# Patient Record
Sex: FEMALE | Race: WHITE | NOT HISPANIC OR LATINO | Employment: OTHER | URBAN - METROPOLITAN AREA
[De-identification: names, ages, dates, MRNs, and addresses within clinical notes are randomized per-mention and may not be internally consistent; named-entity substitution may affect disease eponyms.]

---

## 2017-05-08 NOTE — PATIENT DISCUSSION
New Prescription: FML Forte (fluorometholone): drops,suspension: 0.25% 1 drop three times a day into both eyes 05-

## 2018-08-16 ENCOUNTER — ESTABLISHED COMPREHENSIVE EXAM (OUTPATIENT)
Dept: URBAN - METROPOLITAN AREA CLINIC 39 | Facility: CLINIC | Age: 73
End: 2018-08-16

## 2018-08-16 DIAGNOSIS — H35.371: ICD-10-CM

## 2018-08-16 DIAGNOSIS — H16.223: ICD-10-CM

## 2018-08-16 DIAGNOSIS — H04.123: ICD-10-CM

## 2018-08-16 DIAGNOSIS — Z96.1: ICD-10-CM

## 2018-08-16 PROCEDURE — G8427 DOCREV CUR MEDS BY ELIG CLIN: HCPCS

## 2018-08-16 PROCEDURE — 4040F PNEUMOC VAC/ADMIN/RCVD: CPT

## 2018-08-16 PROCEDURE — 68761S PUNCTUM PLUG / SILICONE,EACH

## 2018-08-16 PROCEDURE — 92015 DETERMINE REFRACTIVE STATE: CPT

## 2018-08-16 PROCEDURE — G8785 BP SCRN NO PERF AT INTERVAL: HCPCS

## 2018-08-16 PROCEDURE — A4263 PERMANENT TEAR DUCT PLUG: HCPCS

## 2018-08-16 PROCEDURE — G9903 PT SCRN TBCO ID AS NON USER: HCPCS

## 2018-08-16 PROCEDURE — 99214 OFFICE O/P EST MOD 30 MIN: CPT

## 2018-08-16 PROCEDURE — G8483 FLU IMM NO ADMIN DOC REA: HCPCS

## 2018-08-16 PROCEDURE — 1036F TOBACCO NON-USER: CPT

## 2018-08-16 RX ORDER — LIFITEGRAST 50 MG/ML: 1 SOLUTION/ DROPS OPHTHALMIC TWICE A DAY

## 2018-08-16 ASSESSMENT — VISUAL ACUITY
OS_SC: J4
OD_SC: 20/60-1
OD_SC: J8
OS_PH: 20/40
OS_CC: J5
OD_CC: 20/60-1
OD_PH: 20/40
OS_CC: 20/50+2
OS_SC: 20/40+1
OD_CC: J3

## 2018-08-16 ASSESSMENT — TONOMETRY
OD_IOP_MMHG: 16
OS_IOP_MMHG: 18

## 2018-09-27 ENCOUNTER — FOLLOW UP (OUTPATIENT)
Dept: URBAN - METROPOLITAN AREA CLINIC 39 | Facility: CLINIC | Age: 73
End: 2018-09-27

## 2018-09-27 DIAGNOSIS — H16.223: ICD-10-CM

## 2018-09-27 DIAGNOSIS — H04.123: ICD-10-CM

## 2018-09-27 PROCEDURE — 92012 INTRM OPH EXAM EST PATIENT: CPT

## 2018-09-27 RX ORDER — MINERAL OIL, PETROLATUM 425; 573 MG/G; MG/G: OINTMENT OPHTHALMIC EVERY EVENING

## 2018-09-27 RX ORDER — DOXYCYCLINE 50 MG/1
1 CAPSULE ORAL ONCE A DAY
Start: 2018-09-27 | End: 2018-10-27

## 2018-09-27 ASSESSMENT — VISUAL ACUITY
OS_PH: 20/40-1
OD_SC: 20/60-2
OD_CC: 20/60-2
OD_SC: J12
OS_SC: 20/40-2
OS_CC: 20/60+1
OS_SC: J12
OS_CC: J10
OD_PH: 20/40
OD_CC: J10

## 2018-09-27 ASSESSMENT — TONOMETRY
OD_IOP_MMHG: 14
OS_IOP_MMHG: 14

## 2018-11-01 ENCOUNTER — FOLLOW UP (OUTPATIENT)
Dept: URBAN - METROPOLITAN AREA CLINIC 39 | Facility: CLINIC | Age: 73
End: 2018-11-01

## 2018-11-01 DIAGNOSIS — L71.9: ICD-10-CM

## 2018-11-01 DIAGNOSIS — H16.223: ICD-10-CM

## 2018-11-01 DIAGNOSIS — H04.123: ICD-10-CM

## 2018-11-01 PROCEDURE — 92012 INTRM OPH EXAM EST PATIENT: CPT

## 2018-11-01 ASSESSMENT — TONOMETRY
OS_IOP_MMHG: 14
OD_IOP_MMHG: 14

## 2018-11-01 ASSESSMENT — VISUAL ACUITY
OS_CC: 20/40
OD_CC: 20/40

## 2019-11-07 ENCOUNTER — ESTABLISHED COMPREHENSIVE EXAM (OUTPATIENT)
Dept: URBAN - METROPOLITAN AREA CLINIC 39 | Facility: CLINIC | Age: 74
End: 2019-11-07

## 2019-11-07 DIAGNOSIS — Z96.1: ICD-10-CM

## 2019-11-07 DIAGNOSIS — H04.123: ICD-10-CM

## 2019-11-07 DIAGNOSIS — L71.9: ICD-10-CM

## 2019-11-07 DIAGNOSIS — H16.223: ICD-10-CM

## 2019-11-07 PROCEDURE — 92014 COMPRE OPH EXAM EST PT 1/>: CPT

## 2019-11-07 PROCEDURE — 92015 DETERMINE REFRACTIVE STATE: CPT

## 2019-11-07 PROCEDURE — 68761S PUNCTUM PLUG / SILICONE,EACH

## 2019-11-07 PROCEDURE — A4263 PERMANENT TEAR DUCT PLUG: HCPCS

## 2019-11-07 ASSESSMENT — VISUAL ACUITY
OS_CC: 20/30-2
OD_CC: J8
OD_PH: 20/30-2
OD_CC: 20/40-1
OD_SC: J12
OD_SC: 20/40
OS_SC: J12
OS_CC: J8
OS_SC: 20/25

## 2019-11-07 ASSESSMENT — TONOMETRY
OD_IOP_MMHG: 15
OS_IOP_MMHG: 15

## 2020-11-12 ENCOUNTER — ESTABLISHED COMPREHENSIVE EXAM (OUTPATIENT)
Dept: URBAN - METROPOLITAN AREA CLINIC 39 | Facility: CLINIC | Age: 75
End: 2020-11-12

## 2020-11-12 DIAGNOSIS — H16.223: ICD-10-CM

## 2020-11-12 DIAGNOSIS — H04.123: ICD-10-CM

## 2020-11-12 DIAGNOSIS — L71.9: ICD-10-CM

## 2020-11-12 DIAGNOSIS — Z96.1: ICD-10-CM

## 2020-11-12 PROCEDURE — 92014 COMPRE OPH EXAM EST PT 1/>: CPT

## 2020-11-12 PROCEDURE — 92015 DETERMINE REFRACTIVE STATE: CPT

## 2020-11-12 ASSESSMENT — VISUAL ACUITY
OS_CC: 20/30+2
OD_SC: J12
OD_CC: 20/30-2
OD_CC: J6
OD_SC: 20/25-2
OS_SC: J12
OS_SC: 20/25-2
OS_CC: J8

## 2020-11-12 ASSESSMENT — TONOMETRY
OS_IOP_MMHG: 16
OD_IOP_MMHG: 16

## 2023-05-25 ENCOUNTER — COMPREHENSIVE EXAM (OUTPATIENT)
Dept: URBAN - METROPOLITAN AREA CLINIC 39 | Facility: CLINIC | Age: 78
End: 2023-05-25

## 2023-05-25 DIAGNOSIS — H04.123: ICD-10-CM

## 2023-05-25 DIAGNOSIS — H35.371: ICD-10-CM

## 2023-05-25 DIAGNOSIS — H16.223: ICD-10-CM

## 2023-05-25 DIAGNOSIS — H52.223: ICD-10-CM

## 2023-05-25 DIAGNOSIS — L71.9: ICD-10-CM

## 2023-05-25 PROCEDURE — 92014 COMPRE OPH EXAM EST PT 1/>: CPT

## 2023-05-25 PROCEDURE — 92015 DETERMINE REFRACTIVE STATE: CPT

## 2023-05-25 RX ORDER — LIFITEGRAST 50 MG/ML
1 SOLUTION/ DROPS OPHTHALMIC TWICE A DAY
Start: 2023-05-25

## 2023-05-25 ASSESSMENT — VISUAL ACUITY
OD_SC: 20/40
OS_CC: 20/20-2
OD_PH: 20/30-2
OS_SC: 20/25
OD_CC: J8
OS_CC: J8
OD_CC: 20/40-1
OD_SC: J12
OS_SC: J12

## 2023-05-25 ASSESSMENT — TONOMETRY
OD_IOP_MMHG: 14
OS_IOP_MMHG: 14

## 2024-02-23 ENCOUNTER — EMERGENCY VISIT (OUTPATIENT)
Dept: URBAN - METROPOLITAN AREA CLINIC 43 | Facility: CLINIC | Age: 79
End: 2024-02-23

## 2024-02-23 DIAGNOSIS — H01.02B: ICD-10-CM

## 2024-02-23 DIAGNOSIS — H01.02A: ICD-10-CM

## 2024-02-23 DIAGNOSIS — H16.223: ICD-10-CM

## 2024-02-23 DIAGNOSIS — H00.025: ICD-10-CM

## 2024-02-23 PROCEDURE — 92012 INTRM OPH EXAM EST PATIENT: CPT

## 2024-02-23 RX ORDER — NEOMYCIN SULFATE, POLYMYXIN B SULFATE AND DEXAMETHASONE 3.5; 10000; 1 MG/ML; [USP'U]/ML; MG/ML: 1 SUSPENSION OPHTHALMIC

## 2024-02-23 ASSESSMENT — VISUAL ACUITY
OS_CC: 20/25-2
OD_CC: 20/20-2

## 2024-05-30 ENCOUNTER — COMPREHENSIVE EXAM (OUTPATIENT)
Dept: URBAN - METROPOLITAN AREA CLINIC 39 | Facility: CLINIC | Age: 79
End: 2024-05-30

## 2024-05-30 DIAGNOSIS — H00.025: ICD-10-CM

## 2024-05-30 DIAGNOSIS — H16.223: ICD-10-CM

## 2024-05-30 DIAGNOSIS — H01.02B: ICD-10-CM

## 2024-05-30 DIAGNOSIS — H52.223: ICD-10-CM

## 2024-05-30 DIAGNOSIS — H35.371: ICD-10-CM

## 2024-05-30 DIAGNOSIS — H01.02A: ICD-10-CM

## 2024-05-30 PROCEDURE — 92015 DETERMINE REFRACTIVE STATE: CPT

## 2024-05-30 PROCEDURE — 92014 COMPRE OPH EXAM EST PT 1/>: CPT | Mod: 25

## 2024-05-30 PROCEDURE — 92134 CPTRZ OPH DX IMG PST SGM RTA: CPT

## 2024-05-30 ASSESSMENT — VISUAL ACUITY
OS_SC: J10
OD_CC: 20/25-2
OS_CC: 20/40-2
OD_SC: 20/30-1
OS_SC: 20/40
OD_SC: J12
OS_PH: 20/25
OD_CC: J6
OD_PH: 20/20-1
OS_CC: J3

## 2024-05-30 ASSESSMENT — TONOMETRY
OS_IOP_MMHG: 16
OD_IOP_MMHG: 17